# Patient Record
Sex: MALE | Race: WHITE | NOT HISPANIC OR LATINO | URBAN - METROPOLITAN AREA
[De-identification: names, ages, dates, MRNs, and addresses within clinical notes are randomized per-mention and may not be internally consistent; named-entity substitution may affect disease eponyms.]

---

## 2022-12-08 ENCOUNTER — WEB ENCOUNTER (OUTPATIENT)
Dept: URBAN - METROPOLITAN AREA CLINIC 98 | Facility: CLINIC | Age: 62
End: 2022-12-08

## 2022-12-08 ENCOUNTER — OFFICE VISIT (OUTPATIENT)
Dept: URBAN - METROPOLITAN AREA CLINIC 98 | Facility: CLINIC | Age: 62
End: 2022-12-08
Payer: SELF-PAY

## 2022-12-08 VITALS — TEMPERATURE: 97.1 F | WEIGHT: 195 LBS | BODY MASS INDEX: 27.92 KG/M2 | HEIGHT: 70 IN

## 2022-12-08 DIAGNOSIS — R09.89 THROAT CLEARING: ICD-10-CM

## 2022-12-08 DIAGNOSIS — Z86.010 HISTORY OF COLON POLYPS: ICD-10-CM

## 2022-12-08 DIAGNOSIS — K21.9 GASTROESOPHAGEAL REFLUX DISEASE, UNSPECIFIED WHETHER ESOPHAGITIS PRESENT: ICD-10-CM

## 2022-12-08 PROBLEM — 428283002: Status: ACTIVE | Noted: 2022-12-08

## 2022-12-08 PROCEDURE — 99213 OFFICE O/P EST LOW 20 MIN: CPT

## 2022-12-08 RX ORDER — PANTOPRAZOLE SODIUM 40 MG/1
1 TABLET TABLET, DELAYED RELEASE ORAL ONCE A DAY
Qty: 30 TABLET | Refills: 3 | OUTPATIENT
Start: 2022-12-08

## 2022-12-08 NOTE — HPI-TODAY'S VISIT:
Patient is a 62-year-old male who presents to discuss repeat colonoscopy.   Last colonoscopy completed 12/23/2019.  Findings included 6 sessile polyps found in the rectum.  Polyps removed roughly 3 mm in size.  Pathology report revealed hyperplastic polyps recommended repeat in 5 years. Patient states he received a message stating he needs to come back to the office to discuss repeat colonoscopy  Denies CIBH  Denies BRBPR or melena  He reports he has a history of GERD- takes Prilosec otc every day. Has for "a decade"  After meals has to clear throat- repeatedly  Denies indigestion or heartburn  Denies vomiting  Denies unintentional weight loss Denies fever or chills

## 2023-01-03 ENCOUNTER — ERX REFILL RESPONSE (OUTPATIENT)
Dept: URBAN - METROPOLITAN AREA CLINIC 98 | Facility: CLINIC | Age: 63
End: 2023-01-03

## 2023-01-03 RX ORDER — PANTOPRAZOLE SODIUM 40 MG/1
TAKE 1 TABLET BY MOUTH EVERY DAY FOR 30 DAYS TABLET, DELAYED RELEASE ORAL
Qty: 30 TABLET | Refills: 3 | OUTPATIENT

## 2023-01-03 RX ORDER — PANTOPRAZOLE SODIUM 40 MG/1
1 TABLET TABLET, DELAYED RELEASE ORAL ONCE A DAY
Qty: 30 TABLET | Refills: 3 | OUTPATIENT

## 2023-01-19 ENCOUNTER — WEB ENCOUNTER (OUTPATIENT)
Dept: URBAN - METROPOLITAN AREA CLINIC 98 | Facility: CLINIC | Age: 63
End: 2023-01-19

## 2023-01-25 ENCOUNTER — OFFICE VISIT (OUTPATIENT)
Dept: URBAN - METROPOLITAN AREA CLINIC 98 | Facility: CLINIC | Age: 63
End: 2023-01-25
Payer: SELF-PAY

## 2023-01-25 ENCOUNTER — LAB OUTSIDE AN ENCOUNTER (OUTPATIENT)
Dept: URBAN - METROPOLITAN AREA CLINIC 98 | Facility: CLINIC | Age: 63
End: 2023-01-25

## 2023-01-25 ENCOUNTER — DASHBOARD ENCOUNTERS (OUTPATIENT)
Age: 63
End: 2023-01-25

## 2023-01-25 VITALS
BODY MASS INDEX: 28.58 KG/M2 | WEIGHT: 193 LBS | SYSTOLIC BLOOD PRESSURE: 124 MMHG | HEART RATE: 89 BPM | DIASTOLIC BLOOD PRESSURE: 84 MMHG | HEIGHT: 69 IN | TEMPERATURE: 97.1 F

## 2023-01-25 DIAGNOSIS — R12 HEARTBURN: ICD-10-CM

## 2023-01-25 DIAGNOSIS — R09.89 THROAT CLEARING: ICD-10-CM

## 2023-01-25 DIAGNOSIS — K21.9 GASTROESOPHAGEAL REFLUX DISEASE, UNSPECIFIED WHETHER ESOPHAGITIS PRESENT: ICD-10-CM

## 2023-01-25 PROCEDURE — 99213 OFFICE O/P EST LOW 20 MIN: CPT

## 2023-01-25 RX ORDER — PANTOPRAZOLE SODIUM 40 MG/1
TAKE 1 TABLET BY MOUTH EVERY DAY FOR 30 DAYS TABLET, DELAYED RELEASE ORAL
Qty: 30 TABLET | Refills: 3 | Status: ACTIVE | COMMUNITY

## 2023-01-25 RX ORDER — METHYLPHENIDATE HYDROCHLORIDE 10 MG/1
1 TABLET ON EMPTY STOMACH TABLET ORAL TWICE A DAY
Status: ACTIVE | COMMUNITY

## 2023-01-25 NOTE — HPI-TODAY'S VISIT:
Previously in 12/2022, Patient is a 62-year-old male who presents to discuss repeat colonoscopy.   Last colonoscopy completed 12/23/2019.  Findings included 6 sessile polyps found in the rectum.  Polyps removed roughly 3 mm in size.  Pathology report revealed hyperplastic polyps recommended repeat in 5 years. Patient states he received a message stating he needs to come back to the office to discuss repeat colonoscopy  Denies CIBH  Denies BRBPR or melena  He reports he has a history of GERD- takes Prilosec otc every day. Has for "a decade"  After meals has to clear throat- repeatedly  Denies indigestion or heartburn  Denies vomiting  Denies unintentional weight loss Denies fever or chills . Today on 1/25/2023, PPI was changed at last visit to pantoprazole once a day. Discussed if no improvement can consider further evaluation with EGD. He endorses pantoprazole improved GERD symptoms. He continues to have throat clearing by the evening time Can feel heartburn 3 times/week despite medication Denies dysphagia or vomiting episodes Denies unintentional weight loss Had emergency [facility] in 12/2022 and was hospitalized for 5 days  Surgeon recommended patient have EGD to evaluate further given hisory of GERD

## 2023-02-17 PROBLEM — 235595009: Status: ACTIVE | Noted: 2022-12-08

## 2023-02-21 ENCOUNTER — TELEPHONE ENCOUNTER (OUTPATIENT)
Dept: URBAN - METROPOLITAN AREA CLINIC 98 | Facility: CLINIC | Age: 63
End: 2023-02-21

## 2023-02-24 ENCOUNTER — OFFICE VISIT (OUTPATIENT)
Dept: URBAN - METROPOLITAN AREA SURGERY CENTER 18 | Facility: SURGERY CENTER | Age: 63
End: 2023-02-24

## 2023-05-01 ENCOUNTER — ERX REFILL RESPONSE (OUTPATIENT)
Dept: URBAN - METROPOLITAN AREA CLINIC 98 | Facility: CLINIC | Age: 63
End: 2023-05-01

## 2023-05-01 RX ORDER — PANTOPRAZOLE SODIUM 40 MG/1
TAKE 1 TABLET BY MOUTH EVERY DAY TABLET, DELAYED RELEASE ORAL
Qty: 90 TABLET | Refills: 1 | OUTPATIENT

## 2023-05-01 RX ORDER — PANTOPRAZOLE SODIUM 40 MG/1
TAKE 1 TABLET BY MOUTH EVERY DAY FOR 30 DAYS TABLET, DELAYED RELEASE ORAL
Qty: 30 TABLET | Refills: 3 | OUTPATIENT